# Patient Record
Sex: MALE | Race: WHITE | URBAN - METROPOLITAN AREA
[De-identification: names, ages, dates, MRNs, and addresses within clinical notes are randomized per-mention and may not be internally consistent; named-entity substitution may affect disease eponyms.]

---

## 2024-05-31 ENCOUNTER — TELEPHONE (OUTPATIENT)
Dept: PHARMACY | Facility: CLINIC | Age: 59
End: 2024-05-31

## 2024-05-31 NOTE — TELEPHONE ENCOUNTER
Will send fax to pcp with below information (is a gabriela doctor though)    Akhil Perkins has been identified with a care gap for Statin Use in Person With Diabetes (SUPD).    Per patient chart review: Patient has tried and failed two statins. An approved CMS dx code may be entered into a billable office visit to exclude this patient from this care gap     If Akhil Perkins has tried and failed two statins in the past from [facility], this patient can be excluded from this care gap by placing a CMS approved dx code in a billable office visit. If Akhil Perkins cannot take a statin due to any of the following, please place a dx code in the patient's visit to have Akhil Perkins excluded from this care gap for 2024.     Please submit one of the following CMS allowable diagnoses codes as a visit diagnosis to Office visit :  Hx Statin Myopathy (G72.0, G72.89, G72.9)    Thank you,  Patricia Flowers, PharmD, [facility]  [facility]  [facility] Clinical Pharmacist  Department: [phone]  ===============================================================================   For Pharmacy Admin Tracking Only    Program: [employer]  CPA in place:  No  Recommendation Provided To: Provider: 1 via Fax sent to office  Intervention Accepted By: Provider: 0  Gap Closed?: No   Time Spent (min): 15